# Patient Record
Sex: MALE | Race: WHITE | NOT HISPANIC OR LATINO | URBAN - METROPOLITAN AREA
[De-identification: names, ages, dates, MRNs, and addresses within clinical notes are randomized per-mention and may not be internally consistent; named-entity substitution may affect disease eponyms.]

---

## 2024-09-12 ENCOUNTER — LAB (OUTPATIENT)
Dept: LAB | Facility: LAB | Age: 39
End: 2024-09-12
Payer: COMMERCIAL

## 2024-09-12 ENCOUNTER — APPOINTMENT (OUTPATIENT)
Dept: LAB | Facility: LAB | Age: 39
End: 2024-09-12
Payer: COMMERCIAL

## 2024-09-12 ENCOUNTER — CONSULT (OUTPATIENT)
Dept: ENDOCRINOLOGY | Facility: CLINIC | Age: 39
End: 2024-09-12
Payer: COMMERCIAL

## 2024-09-12 VITALS — DIASTOLIC BLOOD PRESSURE: 88 MMHG | SYSTOLIC BLOOD PRESSURE: 146 MMHG | HEIGHT: 72 IN | HEART RATE: 57 BPM

## 2024-09-12 DIAGNOSIS — Z11.3 ENCOUNTER FOR SCREENING EXAMINATION FOR SEXUALLY TRANSMITTED DISEASE: ICD-10-CM

## 2024-09-12 DIAGNOSIS — Z11.3 ENCOUNTER FOR SCREENING EXAMINATION FOR SEXUALLY TRANSMITTED DISEASE: Primary | ICD-10-CM

## 2024-09-12 DIAGNOSIS — Z31.41 FERTILITY TESTING: ICD-10-CM

## 2024-09-12 LAB
HBV SURFACE AG SERPL QL IA: NONREACTIVE
HCV AB SER QL: NONREACTIVE
HIV 1+2 AB+HIV1 P24 AG SERPL QL IA: NONREACTIVE
TREPONEMA PALLIDUM IGG+IGM AB [PRESENCE] IN SERUM OR PLASMA BY IMMUNOASSAY: NONREACTIVE

## 2024-09-12 PROCEDURE — 87389 HIV-1 AG W/HIV-1&-2 AB AG IA: CPT

## 2024-09-12 PROCEDURE — 36415 COLL VENOUS BLD VENIPUNCTURE: CPT

## 2024-09-12 PROCEDURE — 99212 OFFICE O/P EST SF 10 MIN: CPT | Performed by: OBSTETRICS & GYNECOLOGY

## 2024-09-12 PROCEDURE — 86780 TREPONEMA PALLIDUM: CPT

## 2024-09-12 PROCEDURE — 87340 HEPATITIS B SURFACE AG IA: CPT

## 2024-09-12 PROCEDURE — 86803 HEPATITIS C AB TEST: CPT

## 2024-09-12 PROCEDURE — 99202 OFFICE O/P NEW SF 15 MIN: CPT | Performed by: OBSTETRICS & GYNECOLOGY

## 2024-09-12 ASSESSMENT — PAIN SCALES - GENERAL: PAINLEVEL: 0-NO PAIN

## 2024-09-12 NOTE — PROGRESS NOTES
NEW FERTILITY PATIENT VISIT- Male Partner    Partner information: Peg Stein [id number]     Jian Stein is a 39 y.o. male who presents with female partner for infertility evaluation       Brief history:   PARTNER HISTORY  Partner Name: Jian Stein  Partner : [id number]  Partner email: [email or web address]  Occupation:   Prior fertility history: Low good sperm count  -PMH: None   PSH: None   Smoking:Yes  -Per patient no  Alcohol Use: Yes  -moderate  Drug Use: No  Medications:    Injuries: No  STD: No  Please select all that are applicable:    SA: Yes  SA Results: Unsure    IUI sample  thaddeus Collected 739    Time Analyzed 756    Elapsed time to analysis  <60 min 17    IUI Prep Type gradient    Liquefaction complete    [place] semen volume  >1.4 ml 2.5    [place] sperm concentration  >14 mil/mL 33.6    Total Count [place]  >38.8 mil/mL 84    % Motile [place]  >39 % 55    [place] Progressive Motility  >31 % 83    Final Volume  mL 0.41    [place] sperm concentration  mil/mL 36.8    % Motile [place]  % 76    Total Motile Sperm  mil/mL 11.47    [place] Progressive Motility  % 60    Comments none      Component  Ref Range & Units 1 yr ago Comments   Time Collected 945    Time Analyzed 1,003    Elapsed time to analysis  <60 min 18    Semen Volume  >1.4 mL 5.8    Density  >14 mil/mL 51    Total Count  >38.8 mil/mL 295.8    Motility  >39 % 67    Progressive Motility  >31 % 42    SPERM AGGLUTINATION None    % Normal  >3 % 2 Low     % Abnormal  % 98    Predominant Abnormality TAPERED HEADS    Round Cells  <5.1 mil/mL 4.9    Appearance-Semen TRENT    Liquefaction Complete    Semen PH  7.2 - 8.0 7.2    Comments AREAS OF SEVERE AGGEGATION            Prior Labs  Lab Results    Date Done      Hepatitis B surface antigen: No results found for requested labs within last 365 days. No results found for requested labs within last 365 days.   Hepatitis C antibody: No results found for requested labs within last 365  days. No results found for requested labs within last 365 days.   HIV ½ Antigen Antibody screen with reflex: No results found for requested labs within last 365 days. No results found for requested labs within last 365 days.   Syphilis screening with reflex: No results found for requested labs within last 365 days. No results found for requested labs within last 365 days.   GC: No results found for requested labs within last 365 days. No results found for requested labs within last 365 days.   CT: No results found for requested labs within last 365 days. No results found for requested labs within last 365 days.      PMH  History reviewed. No pertinent past medical history.     MEDICATIONS  No current outpatient medications on file prior to visit.     No current facility-administered medications on file prior to visit.       PSH  History reviewed. No pertinent surgical history.       SOCIAL HISTORY  Social History     Tobacco Use    Smoking status: Former     Types: Cigarettes     Passive exposure: Past    Smokeless tobacco: Never   Substance Use Topics    Alcohol use: Yes    Drug use: Never         FAMILY HISTORY   No family history on file.     BMI:   BMI Readings from Last 1 Encounters:   No data found for BMI     VITALS:  /88   Pulse 57   Ht 1.829 m (6')       ASSESSMENT   39 y.o. male presents with partner for infertility evaluation  SA: Normal      PLAN  Orders Placed This Encounter   Procedures    Hepatitis B surface antigen    Hepatitis C antibody    HIV 1/2 Antigen/Antibody Screen with Reflex to Confirmation    Syphilis Screen with Reflex    C. Trachomatis / N. Gonorrhoeae, Amplified Detection    POCT Sperm Freeze    Chromosome Analysis, Blood       PARTNER  Yes Semen Analysis: Will order sperm freeze for IVF  Yes Genetic screening: Patient defers today, will consider  STDs as above  Karyotype given recurrent pregnancy loss    FOLLOW UP   Follow up with partner for follow up visit as directed to  review result and further management.     Lydia Ortez  09/12/2024  10:58 AM

## 2024-09-12 NOTE — PATIENT INSTRUCTIONS
ASSESSMENT   39 y.o. male presents with partner for infertility evaluation  SA: Normal      PLAN  Orders Placed This Encounter   Procedures    Hepatitis B surface antigen    Hepatitis C antibody    HIV 1/2 Antigen/Antibody Screen with Reflex to Confirmation    Syphilis Screen with Reflex    C. Trachomatis / N. Gonorrhoeae, Amplified Detection    POCT Sperm Freeze    Chromosome Analysis, Blood       PARTNER  Yes Semen Analysis: Will order sperm freeze for IVF  Yes Genetic screening: Patient defers today, will consider  STDs as above  Karyotype given recurrent pregnancy loss    FOLLOW UP   Follow up with partner for follow up visit as directed to review result and further management.     Lydia Ortez  09/12/2024  10:58 AM

## 2024-11-01 LAB
BAND RESOLUTION: 550 BANDS
CHROM ANALY OVERALL INTERP-IMP: NORMAL
CHROMOSOME ANALYSIS CELLS ANALYZED: 20 CELLS
CHROMOSOME ANALYSIS CELLS IMAGED: 3 CELLS
CHROMOSOME ANALYSIS HYPERMODAL CELL COUNT: 0 CELLS
CHROMOSOME ANALYSIS HYPOMODAL CELL COUNT: 0 CELLS
CHROMOSOME ANALYSIS MODAL CHROMOSOME NO: 46 CHROMOSOMES
CHROMOSOME ANALYSIS STAINING METHOD: NORMAL
ELECTRONICALLY SIGNED BY CYTOGENETICS: NORMAL
KARYOTYP BLD/T: 20 CELLS
TOTAL CELLS COUNTED BLD: 20 CELLS

## 2024-11-05 ENCOUNTER — ANCILLARY PROCEDURE (OUTPATIENT)
Dept: ENDOCRINOLOGY | Facility: CLINIC | Age: 39
End: 2024-11-05

## 2024-11-05 ENCOUNTER — ANCILLARY PROCEDURE (OUTPATIENT)
Dept: ENDOCRINOLOGY | Facility: CLINIC | Age: 39
End: 2024-11-05
Payer: COMMERCIAL

## 2024-11-05 DIAGNOSIS — Z11.3 ENCOUNTER FOR SCREENING EXAMINATION FOR SEXUALLY TRANSMITTED DISEASE: ICD-10-CM

## 2024-11-05 DIAGNOSIS — Z31.41 FERTILITY TESTING: ICD-10-CM

## 2024-11-05 LAB
# OF VIALS: 2
% EX RESIDUAL CYTOPLASM (SEMEN): 0 %
% HEAD DEFECTS (SEMEN): 93.5 %
% NECK MIDPIECE (SEMEN): 26.3 %
% NORMAL (SEMEN): 5 % (ref 4–?)
% TAIL DEFECTS (SEMEN): 2 %
ABSTINENCE (DAYS): 4 DAYS (ref 2–7)
AGGLUTINATION (SEMEN): NO
ANALYZED TIME:: ABNORMAL
ANDROLOGY LAB ID#: ABNORMAL
CLUMPS (SEMEN): NO
COLLECTED COMPLETELY: YES
COLLECTION LOCATION:: ABNORMAL
COLLECTION METHOD:: ABNORMAL
CONCENTRATION CN (POST-WASH): 18.7 MILL/ML
CONCENTRATION(SEMEN): 37.22 MILL/ML (ref 15–?)
DEBRIS (SEMEN): YES
LEUKOCYTE (SEMEN): POSITIVE
NON PROG. MOTILITY (SEMEN): 3 %
NON PROG. MOTILITY CN (POST-WASH): 5 %
PROG. MOTILITY (SEMEN): 79 % (ref 32–?)
PROG. MOTILITY CN (POST-WASH): 59 %
RECEIVED TIME:: ABNORMAL
REI PARTNER DOB: ABNORMAL
REI PARTNER NAME: ABNORMAL
SEMEN APPEARANCE: NORMAL
SEMEN LIQUEFACTION: NORMAL
SEMEN VISCOSITY: NORMAL
SPECIMEN ID REI: ABNORMAL
TOT. NO OF NORM. MOTILE SPERM (SEMEN): 9.93 MILL
TOT. NO OF NORM. SPERM (SEMEN): 12.1 MILL
TOTAL MOTILE SPERM PER VIAL (POST-THAW): 11.44 MILL/VIAL
TOTAL MOTILITY (SEMEN): 82 % (ref 40–?)
TOTAL MOTILITY CN (POST-WASH): 64 %
TOTAL NO OF MOTILE (SEMEN): 198.63 MILL
TOTAL NO OF MOTILE CN (POST-WASH): 47.58 MILL
TOTAL NO OF RND CELLS (SEMEN): 6.5 MILL (ref ?–5)
TOTAL NO OF SPERM (SEMEN): 241.94 MILL (ref 39–?)
TOTAL NO OF SPERM CN (POST-WASH): 74.81 MILL
UNIT VOLUME: 1.8
VOLUME (SEMEN): 6.5 ML (ref 1.5–?)
VOLUME CN (POST-WASH): 4 ML

## 2024-11-05 PROCEDURE — 87491 CHLMYD TRACH DNA AMP PROBE: CPT

## 2024-11-05 PROCEDURE — 87591 N.GONORRHOEAE DNA AMP PROB: CPT

## 2024-11-05 PROCEDURE — 89259 CRYOPRESERVATION SPERM: CPT | Performed by: OBSTETRICS & GYNECOLOGY

## 2024-11-06 LAB
C TRACH RRNA SPEC QL NAA+PROBE: NEGATIVE
N GONORRHOEA DNA SPEC QL PROBE+SIG AMP: NEGATIVE

## 2025-03-23 DIAGNOSIS — Z31.41 FERTILITY TESTING: ICD-10-CM

## 2025-03-25 ENCOUNTER — ANCILLARY PROCEDURE (OUTPATIENT)
Dept: ENDOCRINOLOGY | Facility: CLINIC | Age: 40
End: 2025-03-25

## 2025-03-25 DIAGNOSIS — Z31.41 FERTILITY TESTING: ICD-10-CM

## 2025-03-25 LAB
ABSTINENCE (DAYS): 3 DAYS (ref 2–7)
AGGLUTINATION (SEMEN): NO
ANALYZED TIME:: NORMAL
ANDROLOGY LAB ID#: NORMAL
CLUMPS (SEMEN): YES
COLLECTED COMPLETELY: YES
COLLECTION LOCATION:: NORMAL
COLLECTION METHOD:: NORMAL
CONCENTRATION CN (POST-WASH): 19.8 MILL/ML
CONCENTRATION(SEMEN): 41.26 MILL/ML (ref 15–?)
DEBRIS (SEMEN): YES
NON PROG. MOTILITY (SEMEN): 7 %
NON PROG. MOTILITY CN (POST-WASH): 4 %
PROG. MOTILITY (SEMEN): 66 % (ref 32–?)
PROG. MOTILITY CN (POST-WASH): 94 %
RECEIVED TIME:: NORMAL
REI PARTNER DOB: NORMAL
REI PARTNER NAME: NORMAL
SEMEN APPEARANCE: NORMAL
SEMEN LIQUEFACTION: NORMAL
SEMEN VISCOSITY: NORMAL
SPERM PROCESS METHOD: NORMAL
TOTAL MOTILITY (SEMEN): 73 % (ref 40–?)
TOTAL MOTILITY CN (POST-WASH): 98 %
TOTAL NO OF MOTILE (SEMEN): 132.56 MILL
TOTAL NO OF MOTILE CN (POST-WASH): 9.68 MILL
TOTAL NO OF SPERM (SEMEN): 181.56 MILL (ref 39–?)
TOTAL NO OF SPERM CN (POST-WASH): 9.9 MILL
VOLUME (SEMEN): 4.4 ML (ref 1.5–?)
VOLUME CN (POST-WASH): 0.5 ML